# Patient Record
Sex: FEMALE | Race: BLACK OR AFRICAN AMERICAN | NOT HISPANIC OR LATINO | URBAN - NONMETROPOLITAN AREA
[De-identification: names, ages, dates, MRNs, and addresses within clinical notes are randomized per-mention and may not be internally consistent; named-entity substitution may affect disease eponyms.]

---

## 2019-09-11 ENCOUNTER — IMPORTED ENCOUNTER (OUTPATIENT)
Dept: URBAN - NONMETROPOLITAN AREA CLINIC 1 | Facility: CLINIC | Age: 54
End: 2019-09-11

## 2019-09-11 PROBLEM — H52.13: Noted: 2019-09-11

## 2019-09-11 PROBLEM — E11.9: Noted: 2019-09-11

## 2019-09-11 PROCEDURE — 99204 OFFICE O/P NEW MOD 45 MIN: CPT

## 2019-09-11 NOTE — PATIENT DISCUSSION
DM s DR-Stressed the importance of keeping blood sugars under control blood pressure under control and weight normalization and regular visits with PCP. -Explained the possible effects of poorly controlled diabetes and the damage that diabetes can cause to ocular health. -Patient to check HgbA1C.-Pt instructed to contact our office with any vision changes. Myopia-Discussed diagnosis with patient. -Explained that people who are myopic are at a higher risk for developing RD/RT and reviewed associated S&S.-Pt to contact our office if symptoms develop. Updated spec Rx given. Recommend lens that will provide comfort as well as protect safety and health of eyes. Astigmatism-Discussed diagnosis with patient.

## 2022-03-18 PROBLEM — R00.2 PALPITATION: Status: ACTIVE | Noted: 2018-10-03

## 2022-03-19 PROBLEM — G47.00 INSOMNIA: Status: ACTIVE | Noted: 2018-04-04

## 2022-03-19 PROBLEM — E66.9 OBESITY: Status: ACTIVE | Noted: 2018-04-04

## 2022-03-19 PROBLEM — F15.90 CAFFEINE USE DISORDER: Status: ACTIVE | Noted: 2018-10-03

## 2022-03-19 PROBLEM — Z86.19 HISTORY OF EPSTEIN-BARR VIRUS INFECTION: Status: ACTIVE | Noted: 2018-04-04

## 2022-03-19 PROBLEM — Z79.899 ENCOUNTER FOR LONG-TERM CURRENT USE OF MEDICATION: Status: ACTIVE | Noted: 2019-10-16

## 2022-03-19 PROBLEM — L98.9 SKIN LESION: Status: ACTIVE | Noted: 2018-10-03

## 2022-03-20 PROBLEM — I34.1 MITRAL VALVE PROLAPSE: Status: ACTIVE | Noted: 2018-10-03

## 2022-04-10 ASSESSMENT — VISUAL ACUITY
OS_SC: J2
OD_SC: 20/25
OD_SC: J2
OS_SC: 20/30-1

## 2022-04-10 ASSESSMENT — TONOMETRY
OD_IOP_MMHG: 15
OS_IOP_MMHG: 15

## 2022-06-20 ENCOUNTER — NURSE ONLY (OUTPATIENT)
Dept: INTERNAL MEDICINE CLINIC | Facility: CLINIC | Age: 57
End: 2022-06-20

## 2022-06-20 DIAGNOSIS — Z00.00 ROUTINE GENERAL MEDICAL EXAMINATION AT A HEALTH CARE FACILITY: ICD-10-CM

## 2022-06-20 LAB
ALBUMIN SERPL-MCNC: 3.7 G/DL (ref 3.5–5)
ALBUMIN/GLOB SERPL: 1.1 {RATIO} (ref 1.2–3.5)
ALP SERPL-CCNC: 61 U/L (ref 50–136)
ALT SERPL-CCNC: 18 U/L (ref 12–65)
ANION GAP SERPL CALC-SCNC: 7 MMOL/L (ref 7–16)
AST SERPL-CCNC: 13 U/L (ref 15–37)
BASOPHILS # BLD: 0 K/UL (ref 0–0.2)
BASOPHILS NFR BLD: 1 % (ref 0–2)
BILIRUB SERPL-MCNC: 0.4 MG/DL (ref 0.2–1.1)
BUN SERPL-MCNC: 16 MG/DL (ref 6–23)
CALCIUM SERPL-MCNC: 9.5 MG/DL (ref 8.3–10.4)
CHLORIDE SERPL-SCNC: 106 MMOL/L (ref 98–107)
CHOLEST SERPL-MCNC: 156 MG/DL
CO2 SERPL-SCNC: 27 MMOL/L (ref 21–32)
CREAT SERPL-MCNC: 1 MG/DL (ref 0.6–1)
DIFFERENTIAL METHOD BLD: ABNORMAL
EOSINOPHIL # BLD: 0.2 K/UL (ref 0–0.8)
EOSINOPHIL NFR BLD: 2 % (ref 0.5–7.8)
ERYTHROCYTE [DISTWIDTH] IN BLOOD BY AUTOMATED COUNT: 12.7 % (ref 11.9–14.6)
GLOBULIN SER CALC-MCNC: 3.4 G/DL (ref 2.3–3.5)
GLUCOSE SERPL-MCNC: 86 MG/DL (ref 65–100)
HCT VFR BLD AUTO: 44 % (ref 35.8–46.3)
HDLC SERPL-MCNC: 53 MG/DL (ref 40–60)
HDLC SERPL: 2.9 {RATIO}
HGB BLD-MCNC: 14.3 G/DL (ref 11.7–15.4)
IMM GRANULOCYTES # BLD AUTO: 0 K/UL (ref 0–0.5)
IMM GRANULOCYTES NFR BLD AUTO: 0 % (ref 0–5)
LDLC SERPL CALC-MCNC: 88.6 MG/DL
LYMPHOCYTES # BLD: 2.2 K/UL (ref 0.5–4.6)
LYMPHOCYTES NFR BLD: 30 % (ref 13–44)
MCH RBC QN AUTO: 28.7 PG (ref 26.1–32.9)
MCHC RBC AUTO-ENTMCNC: 32.5 G/DL (ref 31.4–35)
MCV RBC AUTO: 88.4 FL (ref 79.6–97.8)
MONOCYTES # BLD: 0.6 K/UL (ref 0.1–1.3)
MONOCYTES NFR BLD: 9 % (ref 4–12)
NEUTS SEG # BLD: 4.3 K/UL (ref 1.7–8.2)
NEUTS SEG NFR BLD: 58 % (ref 43–78)
NRBC # BLD: 0 K/UL (ref 0–0.2)
PLATELET # BLD AUTO: 296 K/UL (ref 150–450)
PMV BLD AUTO: 9 FL (ref 9.4–12.3)
POTASSIUM SERPL-SCNC: 4.3 MMOL/L (ref 3.5–5.1)
PROT SERPL-MCNC: 7.1 G/DL (ref 6.3–8.2)
RBC # BLD AUTO: 4.98 M/UL (ref 4.05–5.2)
SODIUM SERPL-SCNC: 140 MMOL/L (ref 136–145)
TRIGL SERPL-MCNC: 72 MG/DL (ref 35–150)
TSH W FREE THYROID IF ABNORMAL: 1.75 UIU/ML (ref 0.36–3.74)
VLDLC SERPL CALC-MCNC: 14.4 MG/DL (ref 6–23)
WBC # BLD AUTO: 7.4 K/UL (ref 4.3–11.1)

## 2022-06-21 NOTE — RESULT ENCOUNTER NOTE
Your thyroid is adequately replaced cholesterol has improved. Continue your current doses of medications. Hope you are feeling well. Thanks.   Kait Villagomez

## 2022-07-12 RX ORDER — TRAZODONE HYDROCHLORIDE 100 MG/1
TABLET ORAL
Qty: 90 TABLET | Refills: 3 | Status: SHIPPED | OUTPATIENT
Start: 2022-07-12

## 2022-07-13 ENCOUNTER — TELEMEDICINE (OUTPATIENT)
Dept: INTERNAL MEDICINE CLINIC | Facility: CLINIC | Age: 57
End: 2022-07-13
Payer: COMMERCIAL

## 2022-07-13 DIAGNOSIS — E66.09 CLASS 2 OBESITY DUE TO EXCESS CALORIES WITHOUT SERIOUS COMORBIDITY WITH BODY MASS INDEX (BMI) OF 35.0 TO 35.9 IN ADULT: ICD-10-CM

## 2022-07-13 DIAGNOSIS — F51.01 PRIMARY INSOMNIA: Primary | ICD-10-CM

## 2022-07-13 PROCEDURE — 99213 OFFICE O/P EST LOW 20 MIN: CPT | Performed by: INTERNAL MEDICINE

## 2022-07-13 RX ORDER — BUPROPION HYDROCHLORIDE 150 MG/1
150 TABLET ORAL EVERY MORNING
COMMUNITY
End: 2022-07-13 | Stop reason: SINTOL

## 2022-07-13 SDOH — ECONOMIC STABILITY: FOOD INSECURITY: WITHIN THE PAST 12 MONTHS, YOU WORRIED THAT YOUR FOOD WOULD RUN OUT BEFORE YOU GOT MONEY TO BUY MORE.: NEVER TRUE

## 2022-07-13 SDOH — ECONOMIC STABILITY: FOOD INSECURITY: WITHIN THE PAST 12 MONTHS, THE FOOD YOU BOUGHT JUST DIDN'T LAST AND YOU DIDN'T HAVE MONEY TO GET MORE.: NEVER TRUE

## 2022-07-13 ASSESSMENT — SOCIAL DETERMINANTS OF HEALTH (SDOH): HOW HARD IS IT FOR YOU TO PAY FOR THE VERY BASICS LIKE FOOD, HOUSING, MEDICAL CARE, AND HEATING?: NOT HARD AT ALL

## 2022-07-13 NOTE — PROGRESS NOTES
7/13/2022 3:04 PM  Location:[facility] [address] [facility] MED[facility]INE  [place]  Patient #:  [id number]  YOB: 1965            History of Present Illness     Chief Complaint   Patient presents with    Follow-up     Discuss Wellbutrin    Hypothyroidism       Ms. [facility] is a 55 y.o. female  who presents for follow up on chronic medical problems. Blaine Dominguez is a 55 y.o. female was evaluated through a synchronous (real-time) audio-video encounter. The patient (or guardian if applicable) is aware that this is a billable service, which includes applicable co-pays. This Virtual Visit was conducted with patient's (and/or legal guardian's) consent. The visit was conducted pursuant to the emergency declaration under the [address] [place] [address] and the [employer] ([employer]) and [employer] General Act. Patient identification was verified, and a caregiver was present when appropriate. The patient was located at Home: [place] [address]. Provider was located at [place] [address] (Appt Dept): [place] [address]. Notes that wellbutrin makes her feel lazy. Other  This is a chronic problem. Associated symptoms include fatigue (on meds).           Allergies   Allergen Reactions    Macadamia Nut Oil Other (See Comments)     Walnuts     Penicillins Other (See Comments)     \"been so long, I don't know\"    Avocado Nausea And Vomiting     \"virus-like symptoms\"     Past Medical History:   Diagnosis Date    Arthritis     osteo    Shelton-Barr viral infection 2006    insomnia and joint pain since    Mitral valve prolapse     hx of- pt reports no problems    Obesity (BMI 30-39.9) 4/4/2018     Social History     Socioeconomic History    Marital status:      Spouse name: None    Number of children: None    Years of education: None    Highest education level: None Occupational History    None   Tobacco Use    Smoking status: Former Smoker     Packs/day: 0.50    Smokeless tobacco: Never Used   Substance and Sexual Activity    Alcohol use: Yes    Drug use: No    Sexual activity: None   Other Topics Concern    None   Social History Narrative    None     Social Determinants of Health     Financial Resource Strain: Low Risk     Difficulty of Paying Living Expenses: Not hard at all   Food Insecurity: No Food Insecurity    Worried About Running Out of Food in the Last Year: Never true    Nilo of Food in the Last Year: Never true   Transportation Needs:     Lack of Transportation (Medical): Not on file    Lack of Transportation (Non-Medical):  Not on file   Physical Activity:     Days of Exercise per Week: Not on file    Minutes of Exercise per Session: Not on file   Stress:     Feeling of Stress : Not on file   Social Connections:     Frequency of Communication with Friends and Family: Not on file    Frequency of Social Gatherings with Friends and Family: Not on file    Attends [employer] Services: Not on file    Active Member of [address] [place] [address] or Organizations: Not on file    Attends Club or Organization Meetings: Not on file    Marital Status: Not on file   Intimate Partner Violence:     Fear of Current or Ex-Partner: Not on file    Emotionally Abused: Not on file    Physically Abused: Not on file    Sexually Abused: Not on file   Housing Stability:     Unable to Pay for Housing in the Last Year: Not on file    Number of [place] in the Last Year: Not on file    Unstable Housing in the Last Year: Not on file     Past Surgical History:   Procedure Laterality Date    HYSTERECTOMY (CERVIX STATUS UNKNOWN)  2012    LUMBAR LAMINECTOMY  1988/1994    L5/S1    TOTAL HIP ARTHROPLASTY Right 2014   [address] [facility]     Current Outpatient Medications   Medication Sig Dispense Refill    buPROPion (WELLBUTRIN XL) 150 MG extended release tablet Take 150 mg by mouth every morning      traZODone (DESYREL) 100 MG tablet TAKE ONE TABLET BY MOUTH AT NIGHT AS NEEDED FOR SLEEP 90 tablet 3    Apple Cider Vinegar 300 MG TABS Take by mouth      ascorbic acid (VITAMIN C) 250 MG tablet Take by mouth      Biotin 2.5 MG CAPS Take by mouth      Cholecalciferol 50 MCG (2000 UT) TABS Take by mouth daily      cyanocobalamin 1000 MCG tablet Take 1,000 mcg by mouth daily      levothyroxine (SYNTHROID) 50 MCG tablet Take 50 mcg by mouth every morning (before breakfast)      traZODone (DESYREL) 50 MG tablet Take  mg by mouth      vitamin E 400 UNIT capsule Take 400 Units by mouth daily       No current facility-administered medications for this visit. Health Maintenance   Topic Date Due    HIV screen  Never done    Hepatitis C screen  Never done    DTaP/Tdap/Td vaccine (1 - Tdap) Never done    Cervical cancer screen  Never done    Colorectal Cancer Screen  Never done    Shingles vaccine (1 of 2) Never done    COVID-19 Vaccine (3 - Booster for Moderna series) 07/28/2021    Breast cancer screen  12/09/2021    Flu vaccine (1) 09/01/2022    Depression Screen  11/04/2022    Lipids  06/20/2027    Hepatitis A vaccine  Aged Out    Hepatitis B vaccine  Aged Out    Hib vaccine  Aged Out    Meningococcal (ACWY) vaccine  Aged Out    Pneumococcal 0-64 years Vaccine  Aged Out     Family History   Problem Relation Age of Onset    Prostate Cancer Father     Osteoarthritis Father     Thyroid Disease Mother     Colon Polyps Father     Asthma Sister              Review of Systems  Review of Systems   Constitutional: Positive for fatigue (on meds). Negative for unexpected weight change. There were no vitals taken for this visit. Physical Exam    Physical Exam  Constitutional:       General: She is not in acute distress. Appearance: Normal appearance. HENT:      Head: Normocephalic.    Neurological:      Mental Status: She is alert and oriented to person, place, and time. Psychiatric:         Mood and Affect: Mood normal.         Behavior: Behavior normal.           Assessment & Plan    Current Outpatient Medications   Medication Sig Dispense Refill    buPROPion (WELLBUTRIN XL) 150 MG extended release tablet Take 150 mg by mouth every morning      traZODone (DESYREL) 100 MG tablet TAKE ONE TABLET BY MOUTH AT NIGHT AS NEEDED FOR SLEEP 90 tablet 3    Apple Cider Vinegar 300 MG TABS Take by mouth      ascorbic acid (VITAMIN C) 250 MG tablet Take by mouth      Biotin 2.5 MG CAPS Take by mouth      Cholecalciferol 50 MCG (2000 UT) TABS Take by mouth daily      cyanocobalamin 1000 MCG tablet Take 1,000 mcg by mouth daily      levothyroxine (SYNTHROID) 50 MCG tablet Take 50 mcg by mouth every morning (before breakfast)      traZODone (DESYREL) 50 MG tablet Take  mg by mouth      vitamin E 400 UNIT capsule Take 400 Units by mouth daily       No current facility-administered medications for this visit. No orders of the defined types were placed in this encounter. No orders of the defined types were placed in this encounter. Medications Discontinued During This Encounter   Medication Reason    naltrexone-buPROPion (CONTRAVE) 8-90 MG per extended release tablet LIST CLEANUP        Diagnosis Orders   1. Primary insomnia     2. Class 2 obesity due to excess calories without serious comorbidity with body mass index (BMI) of 35.0 to 35.9 in adult        Will stop the wellbutrin. Refilled her trazodone when requested by the pharmacy. Knows to keep a low threshold for contacting the office with worsening symptoms. Recommended 30 minutes of aerobic exercise at least 4-5 days weekly for physical as well as emotional reasons. Follow up as documented or earlier as needed. Return in about 4 months (around 11/13/2022).           Lulu Falcon MD

## 2022-07-15 ENCOUNTER — PATIENT MESSAGE (OUTPATIENT)
Dept: INTERNAL MEDICINE CLINIC | Facility: CLINIC | Age: 57
End: 2022-07-15

## 2022-07-18 RX ORDER — LEVOTHYROXINE SODIUM 0.07 MG/1
75 TABLET ORAL EVERY MORNING
Qty: 90 TABLET | Refills: 3 | Status: SHIPPED | OUTPATIENT
Start: 2022-07-18

## 2022-07-18 RX ORDER — LEVOTHYROXINE SODIUM 0.07 MG/1
75 TABLET ORAL EVERY MORNING
Refills: 3 | COMMUNITY
Start: 2022-07-07 | End: 2022-07-18 | Stop reason: SDUPTHER

## 2022-07-18 NOTE — TELEPHONE ENCOUNTER
From: Caesar Arellano  To: Dr. Cooney End: 7/15/2022 3:36 PM EDT  Subject: Levothyroxine     Hi Dr Sharon Fung. I forgot to ask you during our visit on Wednesday, since we are leaving my doseage at 75mcg, instead of 30 day supply, could you change my RX to 90 day? There is no rush as I just had it refilled last week. Please and thank you. Have a wonderful weekend.   [place]

## 2022-10-31 RX ORDER — TRAZODONE HYDROCHLORIDE 50 MG/1
TABLET ORAL
Qty: 90 TABLET | Refills: 5 | Status: SHIPPED | OUTPATIENT
Start: 2022-10-31

## 2022-11-15 ENCOUNTER — OFFICE VISIT (OUTPATIENT)
Dept: INTERNAL MEDICINE CLINIC | Facility: CLINIC | Age: 57
End: 2022-11-15
Payer: COMMERCIAL

## 2022-11-15 VITALS
HEART RATE: 66 BPM | WEIGHT: 242.6 LBS | DIASTOLIC BLOOD PRESSURE: 66 MMHG | TEMPERATURE: 98.2 F | HEIGHT: 67 IN | SYSTOLIC BLOOD PRESSURE: 124 MMHG | BODY MASS INDEX: 38.08 KG/M2 | OXYGEN SATURATION: 97 %

## 2022-11-15 DIAGNOSIS — E66.01 CLASS 2 SEVERE OBESITY DUE TO EXCESS CALORIES WITH SERIOUS COMORBIDITY AND BODY MASS INDEX (BMI) OF 38.0 TO 38.9 IN ADULT (HCC): Primary | ICD-10-CM

## 2022-11-15 DIAGNOSIS — M79.672 LEFT FOOT PAIN: ICD-10-CM

## 2022-11-15 PROCEDURE — 99213 OFFICE O/P EST LOW 20 MIN: CPT | Performed by: INTERNAL MEDICINE

## 2022-11-15 RX ORDER — PHENTERMINE HYDROCHLORIDE 37.5 MG/1
37.5 CAPSULE ORAL EVERY MORNING
Qty: 30 CAPSULE | Refills: 0 | Status: SHIPPED | OUTPATIENT
Start: 2022-11-15 | End: 2022-12-15

## 2022-11-15 RX ORDER — CYCLOBENZAPRINE HCL 10 MG
TABLET ORAL
COMMUNITY
Start: 2022-11-04

## 2022-11-15 RX ORDER — PHENTERMINE HYDROCHLORIDE 37.5 MG/1
37.5 CAPSULE ORAL EVERY MORNING
Qty: 30 CAPSULE | Refills: 0 | Status: SHIPPED | OUTPATIENT
Start: 2022-12-15 | End: 2023-01-14

## 2022-11-15 RX ORDER — IBUPROFEN 200 MG
400 TABLET ORAL
Qty: 120 TABLET | Refills: 3 | COMMUNITY
Start: 2022-11-15

## 2022-11-15 ASSESSMENT — PATIENT HEALTH QUESTIONNAIRE - PHQ9
2. FEELING DOWN, DEPRESSED OR HOPELESS: 0
SUM OF ALL RESPONSES TO PHQ QUESTIONS 1-9: 0
SUM OF ALL RESPONSES TO PHQ9 QUESTIONS 1 & 2: 0
SUM OF ALL RESPONSES TO PHQ QUESTIONS 1-9: 0
1. LITTLE INTEREST OR PLEASURE IN DOING THINGS: 0

## 2022-11-15 ASSESSMENT — ENCOUNTER SYMPTOMS
BLOOD IN STOOL: 0
COUGH: 0
SHORTNESS OF BREATH: 0
NAUSEA: 0
WHEEZING: 0
CONSTIPATION: 0
DIARRHEA: 1
VOMITING: 0

## 2022-11-15 NOTE — PROGRESS NOTES
11/15/2022 12:35 PM  Location:[facility] [address] [facility]  [place]  Patient #:  [id number]  YOB: 1965            History of Present Illness     Chief Complaint   Patient presents with    Follow-up     Pt presents to the office today for their 4 month follow-up. Fall     2 weeks ago pt slipped on a piece of paper and injured her foot, knee and hip. Went to urgent care and they said no fracture, but it is not getting better. Ms. Jillian Leal is a 55 y.o. female  who presents for the above. Is having body image issues that she anticipates are going to interfere in her marital relationship.            Allergies   Allergen Reactions    Macadamia Nut Oil Other (See Comments)     Walnuts     Penicillins Other (See Comments)     \"been so long, I don't know\"    Avocado Nausea And Vomiting     \"virus-like symptoms\"     Past Medical History:   Diagnosis Date    Arthritis     osteo    Shelton-Barr viral infection     insomnia and joint pain since    Mitral valve prolapse     hx of- pt reports no problems    Obesity (BMI 30-39.9) 2018     Social History     Socioeconomic History    Marital status:      Spouse name: None    Number of children: None    Years of education: None    Highest education level: None   Tobacco Use    Smoking status: Former     Packs/day: 0.50     Years: 20.00     Pack years: 10.00     Types: Cigarettes     Quit date: 2020     Years since quittin.8    Smokeless tobacco: Never   Substance and Sexual Activity    Alcohol use: Yes    Drug use: No     Social Determinants of Health     Financial Resource Strain: Low Risk     Difficulty of Paying Living Expenses: Not hard at all   Food Insecurity: No Food Insecurity    Worried About Running Out of Food in the Last Year: Never true    Ran Out of Food in the Last Year: Never true     Past Surgical History:   Procedure Laterality Date    HYSTERECTOMY (CERVIX STATUS UNKNOWN)  2012    LUMBAR LAMINECTOMY  1988/1994    L5/S1    TOTAL HIP ARTHROPLASTY Right 2014    TUBAL LIGATION  1990     Current Outpatient Medications   Medication Sig Dispense Refill    phentermine 37.5 MG capsule Take 1 capsule by mouth every morning for 30 days. 30 capsule 0    [START ON 12/15/2022] phentermine 37.5 MG capsule Take 1 capsule by mouth every morning for 30 days. 30 capsule 0    ibuprofen (ADVIL) 200 MG tablet Take 2 tablets by mouth 2 times daily (before meals) 120 tablet 3    traZODone (DESYREL) 50 MG tablet TAKE ONE TO THREE TABLETS BY MOUTH NIGHTLY 90 tablet 5    levothyroxine (SYNTHROID) 75 MCG tablet Take 1 tablet by mouth every morning 90 tablet 3    traZODone (DESYREL) 100 MG tablet TAKE ONE TABLET BY MOUTH AT NIGHT AS NEEDED FOR SLEEP 90 tablet 3    Apple Cider Vinegar 300 MG TABS Take by mouth      ascorbic acid (VITAMIN C) 250 MG tablet Take by mouth      Biotin 2.5 MG CAPS Take by mouth      Cholecalciferol 50 MCG (2000 UT) TABS Take by mouth daily      cyanocobalamin 1000 MCG tablet Take 1,000 mcg by mouth daily      cyclobenzaprine (FLEXERIL) 10 MG tablet        No current facility-administered medications for this visit.      Health Maintenance   Topic Date Due    HIV screen  Never done    Hepatitis C screen  Never done    DTaP/Tdap/Td vaccine (1 - Tdap) Never done    Colorectal Cancer Screen  Never done    Shingles vaccine (1 of 2) Never done    COVID-19 Vaccine (3 - Booster for Moderna series) 04/25/2021    Breast cancer screen  12/09/2021    Flu vaccine (1) Never done    Depression Screen  11/04/2022    Lipids  06/20/2027    Hepatitis A vaccine  Aged Out    Hib vaccine  Aged Out    Meningococcal (ACWY) vaccine  Aged Out    Pneumococcal 0-64 years Vaccine  Aged Out     Family History   Problem Relation Age of Onset    Prostate Cancer Father     Osteoarthritis Father     Thyroid Disease Mother     Colon Polyps Father     Asthma Sister              Review of Systems  Review of Systems   Constitutional: Positive for unexpected weight change. Respiratory:  Negative for cough, shortness of breath and wheezing. Cardiovascular:  Negative for chest pain, palpitations and leg swelling. Gastrointestinal:  Positive for diarrhea (loose stools). Negative for blood in stool, constipation, nausea and vomiting. Musculoskeletal:  Positive for arthralgias. Psychiatric/Behavioral:  Negative for dysphoric mood. The patient is not nervous/anxious. /66 (Site: Left Upper Arm, Position: Sitting, Cuff Size: Medium Adult)   Pulse 66   Temp 98.2 °F (36.8 °C) (Temporal)   Ht 5' 7\" (1.702 m)   Wt 242 lb 9.6 oz (110 kg)   SpO2 97%   BMI 38.00 kg/m²       Physical Exam    Physical Exam  Constitutional:       General: She is not in acute distress. Appearance: Normal appearance. She is obese. HENT:      Head: Normocephalic. Musculoskeletal:      Left ankle: No swelling. No tenderness. Normal range of motion. Left foot: Normal range of motion (pain with rotation of the forefoot). Swelling and tenderness present. Feet:    Neurological:      Mental Status: She is alert and oriented to person, place, and time. Psychiatric:         Mood and Affect: Mood normal.         Behavior: Behavior normal.         Assessment & Plan    Current Outpatient Medications   Medication Sig Dispense Refill    phentermine 37.5 MG capsule Take 1 capsule by mouth every morning for 30 days. 30 capsule 0    [START ON 12/15/2022] phentermine 37.5 MG capsule Take 1 capsule by mouth every morning for 30 days.  30 capsule 0    ibuprofen (ADVIL) 200 MG tablet Take 2 tablets by mouth 2 times daily (before meals) 120 tablet 3    traZODone (DESYREL) 50 MG tablet TAKE ONE TO THREE TABLETS BY MOUTH NIGHTLY 90 tablet 5    levothyroxine (SYNTHROID) 75 MCG tablet Take 1 tablet by mouth every morning 90 tablet 3    traZODone (DESYREL) 100 MG tablet TAKE ONE TABLET BY MOUTH AT NIGHT AS NEEDED FOR SLEEP 90 tablet 3    Apple Cider Vinegar 300 MG TABS Take by mouth      ascorbic acid (VITAMIN C) 250 MG tablet Take by mouth      Biotin 2.5 MG CAPS Take by mouth      Cholecalciferol 50 MCG (2000 UT) TABS Take by mouth daily      cyanocobalamin 1000 MCG tablet Take 1,000 mcg by mouth daily      cyclobenzaprine (FLEXERIL) 10 MG tablet        No current facility-administered medications for this visit. No orders of the defined types were placed in this encounter. Orders Placed This Encounter   Medications    phentermine 37.5 MG capsule     Sig: Take 1 capsule by mouth every morning for 30 days. Dispense:  30 capsule     Refill:  0    phentermine 37.5 MG capsule     Sig: Take 1 capsule by mouth every morning for 30 days. Dispense:  30 capsule     Refill:  0    ibuprofen (ADVIL) 200 MG tablet     Sig: Take 2 tablets by mouth 2 times daily (before meals)     Dispense:  120 tablet     Refill:  3       Medications Discontinued During This Encounter   Medication Reason    vitamin E 400 UNIT capsule LIST CLEANUP        Diagnosis Orders   1. Class 2 severe obesity due to excess calories with serious comorbidity and body mass index (BMI) of 38.0 to 38.9 in adult (HCC)  phentermine 37.5 MG capsule    phentermine 37.5 MG capsule      2. Left foot pain           Advised her to use the above for just a short period of time. Apply a compressive ACE bandage. Rest and elevate the affected painful area. Apply cold compresses intermittently as needed. As pain recedes, begin normal activities slowly as tolerated. Call if symptoms persist for imaging. Take ibuprofen as above. Knows to keep a low threshold for contacting the office with worsening symptoms. Document BP several times on two days weekly. Contact office if not <=130/80 consistently while on the above. Return in about 4 months (around 3/15/2023).           Mely Valentine MD

## 2022-11-22 ENCOUNTER — PATIENT MESSAGE (OUTPATIENT)
Dept: INTERNAL MEDICINE CLINIC | Facility: CLINIC | Age: 57
End: 2022-11-22

## 2022-11-22 DIAGNOSIS — M79.672 LEFT FOOT PAIN: Primary | ICD-10-CM

## 2022-11-22 DIAGNOSIS — S91.302D: ICD-10-CM

## 2022-11-22 NOTE — TELEPHONE ENCOUNTER
From: Rickie Skiff  To: Dr. Martines Crews: 11/22/2022 9:21 AM EST  Subject: Update on my L foot/ankle/leg    I can do most of the exercises, but bending my foot up toward my shin, and rotating inward are both painful. Walking is still an issue. my second and third toes still feel tingly, not quite numb (I don't think) weird feeling. I stay off it as much as possible, and still can't walk long distances. I can't go up the stairs in our home, but can climb 2-3, with a handrail. I am still icing, haven't noticed a significant difference with the ibuprofen. I ordered an ankle brace that gives me a little more compression, it feels a little more supported with this brace. You had asked me to update you this week, I think thats everything :) . Thank you for taking such good care of me.  Happy Thanksgiving!!

## 2022-11-22 NOTE — TELEPHONE ENCOUNTER
Please fax CT scan order to [facility]. Needs this rather than xray. Thanks.   Kait Villagomez

## 2022-12-05 ENCOUNTER — OFFICE VISIT (OUTPATIENT)
Dept: ORTHOPEDIC SURGERY | Age: 57
End: 2022-12-05

## 2022-12-05 VITALS — BODY MASS INDEX: 37.04 KG/M2 | WEIGHT: 236 LBS | HEIGHT: 67 IN

## 2022-12-05 DIAGNOSIS — M76.812 ANTERIOR TIBIALIS TENDINITIS OF LEFT LOWER EXTREMITY: ICD-10-CM

## 2022-12-05 DIAGNOSIS — S99.912A INJURY OF LEFT ANKLE, INITIAL ENCOUNTER: ICD-10-CM

## 2022-12-05 DIAGNOSIS — M25.872 IMPINGEMENT SYNDROME OF LEFT ANKLE: Primary | ICD-10-CM

## 2022-12-05 DIAGNOSIS — M25.572 ACUTE LEFT ANKLE PAIN: ICD-10-CM

## 2022-12-05 RX ORDER — PREDNISONE 5 MG/1
TABLET ORAL
Qty: 1 EACH | Refills: 2 | Status: SHIPPED | OUTPATIENT
Start: 2022-12-05

## 2022-12-05 NOTE — PROGRESS NOTES
The patient was prescribed a walker boot for the patient's left foot. The patient wears a size NA shoe and I fitted them with a M size boot. The patient was fitted and instructed on the use of prescribed walker boot. I explained how to fit themselves and that the plastic flexible piece should always be on the front of the boot and secured by the Velcro straps on top. The air bladder in the boot was adjusted according to proper fit and comfort. The patient walked a short distance and acknowledged satisfaction with current fit. I also explained that they need a heel lift or a higher heeled shoe for the uninvolved LE to help normalize gait and avoid excessive low back stress/strain due to leg length inequality created from walker boot. The patient was also prescribed and fitted with an even up for the right foot while wearing the walker boot on the left. Patient read and signed documenting they understand and agree to [facility]'s current DME return policy.

## 2022-12-05 NOTE — PROGRESS NOTES
Name: Dawson Silver  YOB: 1965  Gender: female  MRN: [id number]     CC: Left ankle pain    HPI:   11/03/2022: She reports falling in [place] injuring her ankle/foot. 11/30/2022: Left foot CT scan  12/05/2022: She presents with improved but persistent anterior medial ankle pain with dorsiflexion and prolonged activity    ROS/Meds/PSH/PMH/FH/SH: reviewed today    Tobacco:  reports that she quit smoking about 2 years ago. Her smoking use included cigarettes. She has a 10.00 pack-year smoking history. She has never used smokeless tobacco.     Physical Examination:  Patient appears to be alert and oriented with acceptable appearance. No obvious distress or SOB  CV: appears to have acceptable vascular color and capillary refill  Neuro: appears to have mostly intact light touch sensation   Skin: No appreciable ankle effusion  MS: Standing: Plantigrade: Gait near full  Left = anterior medial ankle ATT >> ankle joint pain   Left = no lateral pain; no PTT pain; no hindfoot or midfoot pain  Left = good ankle/foot motion; 5/5 strength    XR: Left side: Standing AP lateral mortise ankle plus AP oblique foot taken today with medial malleolar exostoses; anterior medial ankle impingement exostoses; tarsometatarsal appear aligned with no diastases; narrowing calcaneonavicular but no true coalition; no anterior calcaneal process noted  XR Impression:  As above      Reviewed Test/Records/Documents:   11/30/2022: Left foot CT scan: Radiologic impression: No evidence of acute osseous abnormality. Probable remote avulsion injury along the medial malleolus with tiny osteophyte tibiotalar joint. Under findings section radiologist mentions tiny osteophytes along the anterior margin of the tibial plafond and articulation of the medial malleolus and talus.      Injection: Discussed as an option    Assessment:    Left ankle injury: Suspected injury chronic anterior medial ankle impingement exostoses  Left ankle injury: Suspected anterior tibial tendinitis    Plan:   The patient and I discussed the above assessment. We explored treatment options. She hurts more along the ATT but no evidence of ATT deficiency  She has anterior medial ankle impingement exostoses on x-ray and that also could be a source of her pain. I see no fracture line  Advanced medical imaging: Left ankle MRI scan: Potential    DME: Left 3D boot: Right even up   We discussed left ankle care and 3D boot with even up protection for protection of left ankle and right NELA  PT: Prescribed at: [facility]  Orthotic/prosthetic: No indication today       Medication - OTC meds prn: Prescribed: Prednisone 5 mg Sterapred pack; #48; take per package insert  Surgical discussion: No indication today for surgery, but surgical potential for anterior ankle resection exostectomy  Follow up: 3-4 weeks  Work status: Sitting limited standing walking in 3D boot    History and discussion of management with: Her     This note was created using Dragon voice recognition software which may result in errors of speech and spelling recognition and word/phrase syntax errors.

## 2022-12-05 NOTE — LETTER
DME Patient Authorization Form    Name: Melody Guzman  : 1965  MRN: [id number]   Age: 55 y.o. Gender: female  Delivery Address: [facility]     Diagnosis:     ICD-10-CM    1. Left foot pain  M79.672 XR ANKLE LEFT (MIN 3 VIEWS)     XR FOOT LEFT (2 VIEWS)     Amb External Referral To Physical Therapy     [facility] ()     Geryl Nadeem ()      2. Impingement syndrome of left ankle  M25.872 Amb External Referral To Physical Therapy     [facility] ()     Geryl Nadeem ()      3. Injury of left ankle, initial encounter  F25.118W Amb External Referral To Physical Therapy     [facility] ()     Geryl Nadeem ()      4. Anterior tibialis tendinitis of left lower extremity  M76.812 Amb External Referral To Physical Therapy     [facility] ()     [facility] ()           Requested DME:  [facility] - ** ($30) X 1 - right  Walker [facility] -  ($290.00) X 1 - left        Clinical Notes:     **Indicates non-covered items by insurance. Payment expected on date of service. Electronically signed by  Provider: Dimple Valadez MD__Date: 2022                            [address] Tax ID # [id number]        Durable Medical Equipment and/or Orthotics Patient Consent     I understand that my physician has prescribed this medical supply as part of my treatment plan as a matter of Medical Necessity.  I understand that I have a choice in where I receive my prescribed orthopedic supplies and/or services.  I authorize [facility] to furnish this service/product and to provide my insurance carrier with any information requested in order to process for payment.  I instruct my insurance carrier to pay [facility] directly for these services/products.    I understand that my insurance carrier may deny payment for this supply because it is a non-covered item, deemed not medically necessary or considered experimental.   I understand that any cost not covered by my insurance carrier will be solely my financial responsibility.  I have received the Supplier Standards and have reviewed them.  I have received the prescribed item and have been fully instructed on the proper use of the above services/products.    ______ (Patient Initials) I understand that all DME items are non-returnable after being dispensed. Items still in sealed packaging may be returned up to 14 days after purchasing. [address] will replace items that are defective.    ______ (Patient Initials) I understand that [facility] will not file a claim with my insurance carrier for this service/product and I am waiving my right to file a claim on my own for this service/product with my insurance company as this item is NON-COVERED (Denoted by the **) by my Insurance company/policy. ______ (Patient Initials) I understand that I am responsible to bring my equipment to the hospital for any surgery. ______________________________________________  ________________________  Patient / [place]            Thank you for considering [address] Your physician has prescribed specific medical equipment or devices for your home use. The following describes your rights and responsibilities as our customer. Right to Choose Providers: You have a choice regarding which company supplies your home medical equipment and devices, and to consult your physician in this decision. You may choose a medical supply store, a home medical equipment provider, or a specialist such as [facility]. [facility] will coordinate with your physician to provide the medical equipment or devices prescribed for your home use.     Right to Service:  You have the right to considerate, respectful and nondiscriminatory care. You have the right to receive accurate and easily understood information about your health care. If you speak a foreign language, or don't understand the discussions, assistance will be provided to allow you to make informed health care decisions. You have the right to know your treatment options and to participate in decisions about your care, including the right to accept or refuse treatment. You have the right to expect a reasonable response to your requests for treatment or service. You have the right to talk in confidence with health care providers and to have your health care information protected. You have the right to receive an explanation of your bill. You have the right to complain about the service or product you receive. Patient Responsibilities:  Please provide complete and accurate information about your health insurance benefits and make arrangements for the timely payment of your bill. [facility] will, if possible, assume responsibility for billing your insurance (Medicare, Medicaid and commercial) for the prescribed equipment or devices. If your policy does not cover the prescribed product, or only covers a portion of the bill, you are responsible for any remaining balance. Return and Exchange Policy:  [facility] will honor published  Warranties for products. [facility] will accept returns or exchanges within 14 days from the date of receipt, providin) the product must be in new condition; 2) receipt as required; and 3) used disposable and hygiene products may only be returned due to a defective product. Note: Refunds will be issued in a timely manner, please allow 4-6 weeks for processing. Complaint Procedures and DME Consumer Protection Resources:  [facility] values you as a customer, and is committed to resolving patient concerns.   This commitment includes understanding and documenting your concerns, conducting a review of internal procedures, and providing you with an explanation and resolution to your concerns. Should you have any questions about our services or billing process, please contact our office at (practice phone number). If we are unable to resolve the concern, you have the right to direct comments to the office of Consumer Protection, in the [address] or the [facility] office, without fear of repercussion. DMEPOS SUPPLIER STANDARDS    A supplier must be in compliance with all applicable Federal and [facility] and regulatory requirements. A supplier must provide complete and accurate information on the DMEPOS supplier application. Any changes to this information must be reported to the [facility] within 30 days. An authorized individual (one whose signature is binding) must sign the application for billing privileges. A supplier must fill orders from its own inventory, or must contract with other companies for the purchase of items necessary to fill the order. A supplier may not contract with any entity that is currently excluded from the Medicare program, any [facility] program, or from any other Federal procurement or Nonprocurement programs. A supplier must advise beneficiaries that they may rent or purchase inexpensive or routinely purchased durable medical equipment, and of the purchase option for capped rental equipment. A supplier must notify beneficiaries of warranty coverage and honor all warranties under applicable State Law, and repair or replace free of charge Medicare covered items that are under warranty. A supplier must maintain a physical facility on an appropriate site. A supplier must permit CMS, or its agents to conduct on-site inspections to ascertain the supplier's compliance with these standards.   The supplier location must be accessible to beneficiaries during reasonable business hours, and must maintain a visible sign and posted hours of operation. A supplier must maintain a primary business telephone listed under the name of the business in a [employer] or a toll free number available through directory assistance. The exclusive use of a beeper, answering machine or cell phone is prohibited. A supplier must have comprehensive liability insurance in the amount of at least $300,000 that covers both the supplier's place of business and all customers and employees of the supplier. If the supplier manufactures its own items, this insurance must also cover product liability and completed operations. A supplier must agree not to initiate telephone contact with beneficiaries, with a few exceptions allowed. This standard prohibits suppliers from calling beneficiaries in order to solicit new business. A supplier is responsible for delivery and must instruct beneficiaries on use of Medicare covered items, and maintain proof of delivery. A supplier must answer questions, and respond to complaints of the beneficiaries, and maintain documentation of such contacts. A supplier must maintain and replace at no charge or repair directly, or through a service contract with another company, Medicare covered items it has rented to beneficiaries. A supplier must accept returns of substandard (less than full quality for the particular item) or unsuitable items (inappropriate for the beneficiary at the time it was fitted and rented or sold) from beneficiaries. A supplier must disclose these supplier standards to each beneficiary to whom it supplies a Medicare-covered item. A supplier must disclose to the government any person having ownership, financial, or control interest in the supplier. A supplier must not convey or reassign a supplier number; i.e., the supplier may not sell or allow another entity to use its Medicare billing number.   A supplier must have a complaint resolution protocol established to address beneficiary complaints that relate to these standards. A record of these complaints must be maintained at the physical facility. Complaint records must include: the name, address, telephone number and health insurance claim number of the beneficiary, a summary of the complaint, and any action taken to resolve it. A supplier must agree to furnish CMS any information required by the Medicare statute and implementing regulations. A supplier of DMEPOS and other items and services must be accredited by a CMS-approved accreditation organization in order to receive and retain a supplier billing number. The accreditation must indicate the specific products and services, for which the supplier is accredited in order for the supplier to receive payment for those specific products and services. A DMEPOS supplier must notify their accreditation organization when a new DMEPOS location is opened. The accreditation organization may accredit the new supplier location for three months after it is operational without requiring a new site visit. All DMEPOS supplier locations, whether owned or subcontracted, must meet the [employer] and be separately accredited in order to bill Medicare. An accredited supplier may be denied enrollment or their enrollment may be revoked, if CMS determines that they are not in compliance with the DMEPOS quality standards. A DMEPOS supplier must disclose upon enrollment all products and services, including the addition of new product lines for which they are seeking accreditation. If a new product line is added after enrollment, the DMEPOS supplier will be responsible for notifying the accrediting body of the new product so that the DMEPOS supplier can be re-surveyed and accredited for these new products. Must meet the surety bond requirements specified in 42 C. F.R. 424.57(c). Implementation date- May 4, 2009. A supplier must obtain oxygen from a state-licensed oxygen supplier.   A supplier must maintain ordering and referring documentation consistent with provisions found in 42 C. F.R. 424.516(f). DMEPOS suppliers are prohibited from sharing a practice location with certain other Medicare providers and suppliers. DMEPOS suppliers must remain open to the public for a minimum of 30 hours per week with certain exceptions.

## 2022-12-27 ENCOUNTER — OFFICE VISIT (OUTPATIENT)
Dept: ORTHOPEDIC SURGERY | Age: 57
End: 2022-12-27

## 2022-12-27 VITALS — WEIGHT: 242 LBS | BODY MASS INDEX: 37.98 KG/M2 | HEIGHT: 67 IN

## 2022-12-27 DIAGNOSIS — M76.812 ANTERIOR TIBIALIS TENDINITIS OF LEFT LOWER EXTREMITY: ICD-10-CM

## 2022-12-27 DIAGNOSIS — S99.912S INJURY OF LEFT ANKLE, SEQUELA: Primary | ICD-10-CM

## 2022-12-27 DIAGNOSIS — M25.572 ACUTE LEFT ANKLE PAIN: ICD-10-CM

## 2022-12-27 NOTE — PROGRESS NOTES
Name: Giselle Rogers  YOB: 1965  Gender: female  MRN: [id number]     12/27/2022: She reports improvement after medication and 4 sessions of PT. She weaned 3D boot    HPI:   11/03/2022: She reports falling in [place] injuring her ankle/foot. 11/30/2022: Left foot CT scan  12/05/2022: Initial visit for: Left ankle pain: improved but persistent anterior medial ankle pain with dorsiflexion and prolonged activity    ROS/Meds/PSH/PMH/FH/SH: reviewed today    Tobacco:  reports that she quit smoking about 2 years ago. Her smoking use included cigarettes. She has a 10.00 pack-year smoking history. She has never used smokeless tobacco.     Physical Examination:  Patient appears to be alert and oriented with acceptable appearance. No obvious distress or SOB  CV: appears to have acceptable vascular color and capillary refill  Neuro: appears to have mostly intact light touch sensation   Skin: No swelling  MS: Standing: Plantigrade: Gait near full  Left = insertional PTT and insertional ATT pain >> ankle joint pain   Left = full ankle/foot motion; 5/5 strength; no instability; no crepitance    XR: Left side: Standing AP lateral mortise ankle plus AP oblique foot taken 12/05/2022 with medial malleolar exostoses; anterior medial ankle impingement exostoses; tarsometatarsal appear aligned with no diastases; narrowing calcaneonavicular but no true coalition; no anterior calcaneal process noted  XR Impression:  As above      Reviewed Test/Records/Documents:   11/30/2022: Left foot CT scan: Radiologic impression: No evidence of acute osseous abnormality. Probable remote avulsion injury along the medial malleolus with tiny osteophyte tibiotalar joint. Under findings section radiologist mentions tiny osteophytes along the anterior margin of the tibial plafond and articulation of the medial malleolus and talus.      Injection: Discussed as an option    Assessment:    Left ankle injury: chronic anterior medial ankle impingement exostoses  Left ankle injury: Insertional posterior tibial tendinitis; insertional anterior tibial tendinitis    Plan:   The patient and I discussed the above assessment. We explored treatment options. Her pain still surrounds the anterior medial ankle but I feel it is more tendinitis than ankle related  She is much better after Prednisone and 4 sessions of PT  She has no evidence of PTT or PTT deficiency  Due to a right NELA and low back surgeries, she was unable to tolerate the 3D boot  Plan today is ankle brace and continued PT  If no improvement or resolution, MRI scan     Advanced medical imaging: Left ankle MRI scan: Assess anterior medial ankle pain for stress-induced bone edema related to chronic anterior medial ankle impingement exostoses versus ATT insertional tear versus insertional PTT tear    DME: Reparel ankle sleeve: Lace up ankle brace  PT: Prescribed more PT at: [facility]  Orthotic/prosthetic: No indication today       Medication - OTC meds prn: Completed prior prescribed: Prednisone 5 mg Sterapred pack; #48  Surgical discussion: No indication today for surgery, but surgical potential for anterior ankle resection exostectomy  Follow up: As discussed  Work status: Sitting limited standing walking     This note was created using Dragon voice recognition software which may result in errors of speech and spelling recognition and word/phrase syntax errors.

## 2022-12-27 NOTE — LETTER
DME Patient Authorization Form    Name: Boubacar Green  : 1965  MRN: [id number]   Age: 65 y.o. Gender: female  Delivery Address: [facility]     Diagnosis:     ICD-10-CM    1. Injury of left ankle, sequela  S99.912S       2. Anterior tibialis tendinitis of left lower extremity  M76.812       3. Acute left ankle pain  M25.572            Requested DME:  Reparel Ankle Sleeve**AANKL ($30) X 1 - left  Wraptor Ankle Brace - -42 ($129.00) X 1 - left        Clinical Notes:     **Indicates non-covered items by insurance. Payment expected on date of service. Electronically signed by  Provider: Winston Mast MD__Date: 2022                            [facility]/[address] Tax ID # [id number]        Durable Medical Equipment and/or Orthotics Patient Consent     I understand that my physician has prescribed this medical supply as part of my treatment plan as a matter of Medical Necessity.  I understand that I have a choice in where I receive my prescribed orthopedic supplies and/or services.  I authorize [facility] to furnish this service/product and to provide my insurance carrier with any information requested in order to process for payment.  I instruct my insurance carrier to pay [facility] directly for these services/products.  I understand that my insurance carrier may deny payment for this supply because it is a non-covered item, deemed not medically necessary or considered experimental.   I understand that any cost not covered by my insurance carrier will be solely my financial responsibility.  I have received the Supplier Standards and have reviewed them.    I have received the prescribed item and have been fully instructed on the proper use of the above services/products.    ______ (Patient Initials) I understand that all DME items are non-returnable after being have the right to expect a reasonable response to your requests for treatment or service. You have the right to talk in confidence with health care providers and to have your health care information protected. You have the right to receive an explanation of your bill. You have the right to complain about the service or product you receive. Patient Responsibilities:  Please provide complete and accurate information about your health insurance benefits and make arrangements for the timely payment of your bill. [facility] will, if possible, assume responsibility for billing your insurance (Medicare, Medicaid and commercial) for the prescribed equipment or devices. If your policy does not cover the prescribed product, or only covers a portion of the bill, you are responsible for any remaining balance. Return and Exchange Policy:  [facility] will honor published  Warranties for products. [facility] will accept returns or exchanges within 14 days from the date of receipt, providin) the product must be in new condition; 2) receipt as required; and 3) used disposable and hygiene products may only be returned due to a defective product. Note: Refunds will be issued in a timely manner, please allow 4-6 weeks for processing. Complaint Procedures and DME Consumer Protection Resources:  [facility] values you as a customer, and is committed to resolving patient concerns. This commitment includes understanding and documenting your concerns, conducting a review of internal procedures, and providing you with an explanation and resolution to your concerns. Should you have any questions about our services or billing process, please contact our office at (practice phone number). If we are unable to resolve the concern, you have the right to direct comments to the office of Consumer Protection, in the [address] or the [facility] office, without fear of repercussion.     Aga Greer STANDARDS    A supplier must be in compliance with all applicable Federal and State licensure and regulatory requirements. A supplier must provide complete and accurate information on the DMEPOS supplier application. Any changes to this information must be reported to the [facility] within 30 days. An authorized individual (one whose signature is binding) must sign the application for billing privileges. A supplier must fill orders from its own inventory, or must contract with other companies for the purchase of items necessary to fill the order. A supplier may not contract with any entity that is currently excluded from the Medicare program, any [facility] program, or from any other Federal procurement or Nonprocurement programs. A supplier must advise beneficiaries that they may rent or purchase inexpensive or routinely purchased durable medical equipment, and of the purchase option for capped rental equipment. A supplier must notify beneficiaries of warranty coverage and honor all warranties under applicable State Law, and repair or replace free of charge Medicare covered items that are under warranty. A supplier must maintain a physical facility on an appropriate site. A supplier must permit CMS, or its agents to conduct on-site inspections to ascertain the supplier's compliance with these standards. The supplier location must be accessible to beneficiaries during reasonable business hours, and must maintain a visible sign and posted hours of operation. A supplier must maintain a primary business telephone listed under the name of the business in a [employer] or a toll free number available through directory assistance. The exclusive use of a beeper, answering machine or cell phone is prohibited.   A supplier must have comprehensive liability insurance in the amount of at least $300,000 that covers both the supplier's place of business and all customers and employees of the supplier. If the supplier manufactures its own items, this insurance must also cover product liability and completed operations. A supplier must agree not to initiate telephone contact with beneficiaries, with a few exceptions allowed. This standard prohibits suppliers from calling beneficiaries in order to solicit new business. A supplier is responsible for delivery and must instruct beneficiaries on use of Medicare covered items, and maintain proof of delivery. A supplier must answer questions, and respond to complaints of the beneficiaries, and maintain documentation of such contacts. A supplier must maintain and replace at no charge or repair directly, or through a service contract with another company, Medicare covered items it has rented to beneficiaries. A supplier must accept returns of substandard (less than full quality for the particular item) or unsuitable items (inappropriate for the beneficiary at the time it was fitted and rented or sold) from beneficiaries. A supplier must disclose these supplier standards to each beneficiary to whom it supplies a Medicare-covered item. A supplier must disclose to the government any person having ownership, financial, or control interest in the supplier. A supplier must not convey or reassign a supplier number; i.e., the supplier may not sell or allow another entity to use its Medicare billing number. A supplier must have a complaint resolution protocol established to address beneficiary complaints that relate to these standards. A record of these complaints must be maintained at the physical facility. Complaint records must include: the name, address, telephone number and health insurance claim number of the beneficiary, a summary of the complaint, and any action taken to resolve it. A supplier must agree to furnish CMS any information required by the Medicare statute and implementing regulations.   A supplier of DMEPOS and other items and services must be accredited by a CMS-approved accreditation organization in order to receive and retain a supplier billing number. The accreditation must indicate the specific products and services, for which the supplier is accredited in order for the supplier to receive payment for those specific products and services. A DMEPOS supplier must notify their accreditation organization when a new DMEPOS location is opened. The accreditation organization may accredit the new supplier location for three months after it is operational without requiring a new site visit. All DMEPOS supplier locations, whether owned or subcontracted, must meet the [employer] and be separately accredited in order to bill Medicare. An accredited supplier may be denied enrollment or their enrollment may be revoked, if CMS determines that they are not in compliance with the DMEPOS quality standards. A DMEPOS supplier must disclose upon enrollment all products and services, including the addition of new product lines for which they are seeking accreditation. If a new product line is added after enrollment, the DMEPOS supplier will be responsible for notifying the accrediting body of the new product so that the DMEPOS supplier can be re-surveyed and accredited for these new products. Must meet the surety bond requirements specified in 42 C. F.R. 424.57(c). Implementation date- May 4, 2009. A supplier must obtain oxygen from a state-licensed oxygen supplier. A supplier must maintain ordering and referring documentation consistent with provisions found in 42 C. F.R. 424.516(f). DMEPOS suppliers are prohibited from sharing a practice location with certain other Medicare providers and suppliers. DMEPOS suppliers must remain open to the public for a minimum of 30 hours per week with certain exceptions.

## 2022-12-28 NOTE — PROGRESS NOTES
Patient was fitted and instructed on a Reparel Ankle Sleeve for the left foot. The patient was prescribed a Wraptor brace for the patient's leftfoot. The patient wears a size NA shoe and I fitted the patient with a L brace. I explained how to fit the brace properly by pulling the lace tabs across top of foot first then under arch and lastly pulling the strap up firmly and attaching to the lateral Velcro strip. Thus forming a figure 8 across the ankle joint. Once the figure 8 is completed they are to secure the top (short circumferential) straps to help avoid the straps from loosening with normal wear. The patient was able to demonstrate proper fitting in office to ensure compliance with device and acknowledged satisfaction with current fit. Patient read and signed documenting they understand and agree to [facility]'s current DME return policy.

## 2023-01-30 ENCOUNTER — PATIENT MESSAGE (OUTPATIENT)
Dept: INTERNAL MEDICINE CLINIC | Facility: CLINIC | Age: 58
End: 2023-01-30

## 2023-01-30 DIAGNOSIS — E66.01 CLASS 2 SEVERE OBESITY DUE TO EXCESS CALORIES WITH SERIOUS COMORBIDITY AND BODY MASS INDEX (BMI) OF 38.0 TO 38.9 IN ADULT (HCC): ICD-10-CM

## 2023-01-30 RX ORDER — PHENTERMINE HYDROCHLORIDE 37.5 MG/1
37.5 CAPSULE ORAL EVERY MORNING
Qty: 30 CAPSULE | Refills: 1 | Status: SHIPPED | OUTPATIENT
Start: 2023-01-30 | End: 2023-04-30

## 2023-01-30 RX ORDER — PHENTERMINE HYDROCHLORIDE 37.5 MG/1
37.5 CAPSULE ORAL EVERY MORNING
Qty: 30 CAPSULE | Refills: 0 | Status: SHIPPED | OUTPATIENT
Start: 2023-01-30 | End: 2023-01-30

## 2023-04-21 RX ORDER — TRAZODONE HYDROCHLORIDE 50 MG/1
TABLET ORAL
Qty: 90 TABLET | Refills: 5 | Status: SHIPPED | OUTPATIENT
Start: 2023-04-21

## 2023-05-18 ENCOUNTER — OFFICE VISIT (OUTPATIENT)
Dept: INTERNAL MEDICINE CLINIC | Facility: CLINIC | Age: 58
End: 2023-05-18
Payer: COMMERCIAL

## 2023-05-18 VITALS
SYSTOLIC BLOOD PRESSURE: 122 MMHG | DIASTOLIC BLOOD PRESSURE: 65 MMHG | WEIGHT: 214 LBS | HEART RATE: 74 BPM | BODY MASS INDEX: 33.59 KG/M2 | RESPIRATION RATE: 18 BRPM | HEIGHT: 67 IN

## 2023-05-18 DIAGNOSIS — Z12.31 OTHER SCREENING MAMMOGRAM: Primary | ICD-10-CM

## 2023-05-18 DIAGNOSIS — I34.1 MITRAL VALVE PROLAPSE: ICD-10-CM

## 2023-05-18 DIAGNOSIS — Z00.00 GENERAL MEDICAL EXAM: ICD-10-CM

## 2023-05-18 DIAGNOSIS — E66.01 CLASS 2 SEVERE OBESITY DUE TO EXCESS CALORIES WITH SERIOUS COMORBIDITY AND BODY MASS INDEX (BMI) OF 38.0 TO 38.9 IN ADULT (HCC): ICD-10-CM

## 2023-05-18 DIAGNOSIS — Z79.899 ENCOUNTER FOR LONG-TERM CURRENT USE OF MEDICATION: ICD-10-CM

## 2023-05-18 DIAGNOSIS — E03.9 ACQUIRED HYPOTHYROIDISM: ICD-10-CM

## 2023-05-18 LAB
ALBUMIN SERPL-MCNC: 3.8 G/DL (ref 3.5–5)
ALBUMIN/GLOB SERPL: 1 (ref 0.4–1.6)
ALP SERPL-CCNC: 65 U/L (ref 50–136)
ALT SERPL-CCNC: 23 U/L (ref 12–65)
ANION GAP SERPL CALC-SCNC: 7 MMOL/L (ref 2–11)
APPEARANCE UR: CLEAR
AST SERPL-CCNC: 18 U/L (ref 15–37)
BASOPHILS # BLD: 0.1 K/UL (ref 0–0.2)
BASOPHILS NFR BLD: 1 % (ref 0–2)
BILIRUB SERPL-MCNC: 0.7 MG/DL (ref 0.2–1.1)
BILIRUB UR QL: NEGATIVE
BUN SERPL-MCNC: 10 MG/DL (ref 6–23)
CALCIUM SERPL-MCNC: 9.1 MG/DL (ref 8.3–10.4)
CHLORIDE SERPL-SCNC: 105 MMOL/L (ref 101–110)
CHOLEST SERPL-MCNC: 156 MG/DL
CO2 SERPL-SCNC: 26 MMOL/L (ref 21–32)
COLOR UR: NORMAL
CREAT SERPL-MCNC: 1 MG/DL (ref 0.6–1)
DIFFERENTIAL METHOD BLD: ABNORMAL
EOSINOPHIL # BLD: 0.1 K/UL (ref 0–0.8)
EOSINOPHIL NFR BLD: 1 % (ref 0.5–7.8)
ERYTHROCYTE [DISTWIDTH] IN BLOOD BY AUTOMATED COUNT: 12.7 % (ref 11.9–14.6)
GLOBULIN SER CALC-MCNC: 3.7 G/DL (ref 2.8–4.5)
GLUCOSE SERPL-MCNC: 74 MG/DL (ref 65–100)
GLUCOSE UR STRIP.AUTO-MCNC: NEGATIVE MG/DL
HCT VFR BLD AUTO: 46.6 % (ref 35.8–46.3)
HDLC SERPL-MCNC: 56 MG/DL (ref 40–60)
HDLC SERPL: 2.8
HGB BLD-MCNC: 15.3 G/DL (ref 11.7–15.4)
HGB UR QL STRIP: NEGATIVE
IMM GRANULOCYTES # BLD AUTO: 0 K/UL (ref 0–0.5)
IMM GRANULOCYTES NFR BLD AUTO: 0 % (ref 0–5)
KETONES UR QL STRIP.AUTO: NEGATIVE MG/DL
LDLC SERPL CALC-MCNC: 82.2 MG/DL
LEUKOCYTE ESTERASE UR QL STRIP.AUTO: NEGATIVE
LYMPHOCYTES # BLD: 2.2 K/UL (ref 0.5–4.6)
LYMPHOCYTES NFR BLD: 31 % (ref 13–44)
MCH RBC QN AUTO: 29.3 PG (ref 26.1–32.9)
MCHC RBC AUTO-ENTMCNC: 32.8 G/DL (ref 31.4–35)
MCV RBC AUTO: 89.3 FL (ref 82–102)
MONOCYTES # BLD: 0.6 K/UL (ref 0.1–1.3)
MONOCYTES NFR BLD: 8 % (ref 4–12)
NEUTS SEG # BLD: 4.1 K/UL (ref 1.7–8.2)
NEUTS SEG NFR BLD: 59 % (ref 43–78)
NITRITE UR QL STRIP.AUTO: NEGATIVE
NRBC # BLD: 0 K/UL (ref 0–0.2)
PH UR STRIP: 5.5 (ref 5–9)
PLATELET # BLD AUTO: 325 K/UL (ref 150–450)
PMV BLD AUTO: 9.1 FL (ref 9.4–12.3)
POTASSIUM SERPL-SCNC: 4.2 MMOL/L (ref 3.5–5.1)
PROT SERPL-MCNC: 7.5 G/DL (ref 6.3–8.2)
PROT UR STRIP-MCNC: NEGATIVE MG/DL
RBC # BLD AUTO: 5.22 M/UL (ref 4.05–5.2)
SODIUM SERPL-SCNC: 138 MMOL/L (ref 133–143)
SP GR UR REFRACTOMETRY: 1.01 (ref 1–1.02)
T4 FREE SERPL-MCNC: 1.3 NG/DL (ref 0.78–1.46)
TRIGL SERPL-MCNC: 89 MG/DL (ref 35–150)
TSH W FREE THYROID IF ABNORMAL: 0.34 UIU/ML (ref 0.36–3.74)
UROBILINOGEN UR QL STRIP.AUTO: 0.2 EU/DL (ref 0.2–1)
VLDLC SERPL CALC-MCNC: 17.8 MG/DL (ref 6–23)
WBC # BLD AUTO: 7 K/UL (ref 4.3–11.1)

## 2023-05-18 PROCEDURE — 99214 OFFICE O/P EST MOD 30 MIN: CPT | Performed by: INTERNAL MEDICINE

## 2023-05-18 RX ORDER — PHENTERMINE HYDROCHLORIDE 37.5 MG/1
37.5 CAPSULE ORAL EVERY MORNING
Qty: 30 CAPSULE | Refills: 1 | Status: SHIPPED | OUTPATIENT
Start: 2023-05-18 | End: 2023-08-16

## 2023-05-18 SDOH — ECONOMIC STABILITY: INCOME INSECURITY: HOW HARD IS IT FOR YOU TO PAY FOR THE VERY BASICS LIKE FOOD, HOUSING, MEDICAL CARE, AND HEATING?: NOT HARD AT ALL

## 2023-05-18 SDOH — ECONOMIC STABILITY: FOOD INSECURITY: WITHIN THE PAST 12 MONTHS, YOU WORRIED THAT YOUR FOOD WOULD RUN OUT BEFORE YOU GOT MONEY TO BUY MORE.: NEVER TRUE

## 2023-05-18 SDOH — ECONOMIC STABILITY: HOUSING INSECURITY
IN THE LAST 12 MONTHS, WAS THERE A TIME WHEN YOU DID NOT HAVE A STEADY PLACE TO SLEEP OR SLEPT IN A SHELTER (INCLUDING NOW)?: NO

## 2023-05-18 SDOH — ECONOMIC STABILITY: FOOD INSECURITY: WITHIN THE PAST 12 MONTHS, THE FOOD YOU BOUGHT JUST DIDN'T LAST AND YOU DIDN'T HAVE MONEY TO GET MORE.: NEVER TRUE

## 2023-05-18 ASSESSMENT — ENCOUNTER SYMPTOMS
NAUSEA: 0
DIARRHEA: 0
VOMITING: 0
WHEEZING: 0
CONSTIPATION: 0
BLOOD IN STOOL: 0
COUGH: 0
SHORTNESS OF BREATH: 0

## 2023-05-18 ASSESSMENT — PATIENT HEALTH QUESTIONNAIRE - PHQ9
1. LITTLE INTEREST OR PLEASURE IN DOING THINGS: 0
SUM OF ALL RESPONSES TO PHQ9 QUESTIONS 1 & 2: 0
SUM OF ALL RESPONSES TO PHQ QUESTIONS 1-9: 0
2. FEELING DOWN, DEPRESSED OR HOPELESS: 0

## 2023-05-18 NOTE — PROGRESS NOTES
adenopathy. Neurological:      Mental Status: She is alert and oriented to person, place, and time. Motor: No weakness. Gait: Gait normal.      Deep Tendon Reflexes:      Reflex Scores:       Patellar reflexes are 2+ on the right side and 2+ on the left side. Psychiatric:         Mood and Affect: Mood normal.         Behavior: Behavior normal.         Assessment & Plan    Current Outpatient Medications   Medication Sig Dispense Refill    phentermine 37.5 MG capsule Take 1 capsule by mouth every morning for 90 days. Max Daily Amount: 37.5 mg 30 capsule 1    traZODone (DESYREL) 50 MG tablet TAKE 1-3 TABLETS BY MOUTH NIGHTLY 90 tablet 5    ibuprofen (ADVIL) 200 MG tablet Take 2 tablets by mouth 2 times daily (before meals) 120 tablet 3    traZODone (DESYREL) 100 MG tablet TAKE ONE TABLET BY MOUTH AT NIGHT AS NEEDED FOR SLEEP 90 tablet 3    Apple Cider Vinegar 300 MG TABS Take by mouth      ascorbic acid (VITAMIN C) 250 MG tablet Take by mouth      Biotin 2.5 MG CAPS Take by mouth      Cholecalciferol 50 MCG (2000 UT) TABS Take by mouth daily      cyanocobalamin 1000 MCG tablet Take 1 tablet by mouth daily      levothyroxine (SYNTHROID) 75 MCG tablet Take 1 tablet by mouth every morning 90 tablet 3     No current facility-administered medications for this visit.        Orders Placed This Encounter   Procedures    ALISA KELIN DIGITAL SCREEN BILATERAL     Standing Status:   Future     Standing Expiration Date:   7/18/2024     Scheduling Instructions:      2/3/2021 [place]     Order Specific Question:   Reason for exam:     Answer:   screening    Urinalysis     Standing Status:   Future     Number of Occurrences:   1     Standing Expiration Date:   5/18/2024    Comprehensive Metabolic Panel     Standing Status:   Future     Number of Occurrences:   1     Standing Expiration Date:   5/18/2024    TSH with Reflex     Standing Status:   Future     Number of Occurrences:   1     Standing Expiration Date:

## 2023-05-19 RX ORDER — LEVOTHYROXINE SODIUM 0.07 MG/1
75 TABLET ORAL EVERY MORNING
Qty: 90 TABLET | Refills: 3 | Status: SHIPPED | OUTPATIENT
Start: 2023-05-19

## 2023-05-19 NOTE — RESULT ENCOUNTER NOTE
Labs are stable except you have been getting too much thyroid hormone. Please return to the 75 mcg dosage. Otherwise, continue your current doses of medications. Keep up the good work. Thanks.   Kait Villagomez

## 2023-10-25 RX ORDER — TRAZODONE HYDROCHLORIDE 50 MG/1
TABLET ORAL
Qty: 90 TABLET | Refills: 5 | Status: SHIPPED | OUTPATIENT
Start: 2023-10-25

## 2023-12-03 ASSESSMENT — PATIENT HEALTH QUESTIONNAIRE - PHQ9
1. LITTLE INTEREST OR PLEASURE IN DOING THINGS: 1
SUM OF ALL RESPONSES TO PHQ9 QUESTIONS 1 & 2: 2
2. FEELING DOWN, DEPRESSED OR HOPELESS: 1
SUM OF ALL RESPONSES TO PHQ QUESTIONS 1-9: 2
SUM OF ALL RESPONSES TO PHQ QUESTIONS 1-9: 2
2. FEELING DOWN, DEPRESSED OR HOPELESS: SEVERAL DAYS
SUM OF ALL RESPONSES TO PHQ9 QUESTIONS 1 & 2: 2
1. LITTLE INTEREST OR PLEASURE IN DOING THINGS: SEVERAL DAYS
SUM OF ALL RESPONSES TO PHQ QUESTIONS 1-9: 2
SUM OF ALL RESPONSES TO PHQ QUESTIONS 1-9: 2

## 2023-12-06 ENCOUNTER — OFFICE VISIT (OUTPATIENT)
Dept: INTERNAL MEDICINE CLINIC | Facility: CLINIC | Age: 58
End: 2023-12-06
Payer: COMMERCIAL

## 2023-12-06 VITALS
DIASTOLIC BLOOD PRESSURE: 61 MMHG | SYSTOLIC BLOOD PRESSURE: 129 MMHG | HEART RATE: 83 BPM | RESPIRATION RATE: 18 BRPM | BODY MASS INDEX: 35.16 KG/M2 | HEIGHT: 67 IN | WEIGHT: 224 LBS

## 2023-12-06 DIAGNOSIS — E66.01 CLASS 2 SEVERE OBESITY DUE TO EXCESS CALORIES WITH SERIOUS COMORBIDITY AND BODY MASS INDEX (BMI) OF 38.0 TO 38.9 IN ADULT (HCC): Primary | ICD-10-CM

## 2023-12-06 DIAGNOSIS — B35.3 TINEA PEDIS OF RIGHT FOOT: ICD-10-CM

## 2023-12-06 DIAGNOSIS — E03.9 ACQUIRED HYPOTHYROIDISM: ICD-10-CM

## 2023-12-06 DIAGNOSIS — M54.50 LOW BACK PAIN, UNSPECIFIED BACK PAIN LATERALITY, UNSPECIFIED CHRONICITY, UNSPECIFIED WHETHER SCIATICA PRESENT: ICD-10-CM

## 2023-12-06 DIAGNOSIS — Z79.899 ENCOUNTER FOR LONG-TERM CURRENT USE OF MEDICATION: ICD-10-CM

## 2023-12-06 DIAGNOSIS — F51.01 PRIMARY INSOMNIA: ICD-10-CM

## 2023-12-06 LAB
ALBUMIN SERPL-MCNC: 4 G/DL (ref 3.5–5)
ALBUMIN/GLOB SERPL: 1.1 (ref 0.4–1.6)
ALP SERPL-CCNC: 59 U/L (ref 50–136)
ALT SERPL-CCNC: 22 U/L (ref 12–65)
ANION GAP SERPL CALC-SCNC: 6 MMOL/L (ref 2–11)
AST SERPL-CCNC: 14 U/L (ref 15–37)
BILIRUB SERPL-MCNC: 0.6 MG/DL (ref 0.2–1.1)
BUN SERPL-MCNC: 17 MG/DL (ref 6–23)
CALCIUM SERPL-MCNC: 9.6 MG/DL (ref 8.3–10.4)
CHLORIDE SERPL-SCNC: 105 MMOL/L (ref 103–113)
CO2 SERPL-SCNC: 27 MMOL/L (ref 21–32)
CREAT SERPL-MCNC: 1 MG/DL (ref 0.6–1)
GLOBULIN SER CALC-MCNC: 3.8 G/DL (ref 2.8–4.5)
GLUCOSE SERPL-MCNC: 98 MG/DL (ref 65–100)
POTASSIUM SERPL-SCNC: 4.1 MMOL/L (ref 3.5–5.1)
PROT SERPL-MCNC: 7.8 G/DL (ref 6.3–8.2)
SODIUM SERPL-SCNC: 138 MMOL/L (ref 136–146)
TSH W FREE THYROID IF ABNORMAL: 2.06 UIU/ML (ref 0.36–3.74)

## 2023-12-06 PROCEDURE — 99214 OFFICE O/P EST MOD 30 MIN: CPT | Performed by: INTERNAL MEDICINE

## 2023-12-06 RX ORDER — CYCLOBENZAPRINE HCL 5 MG
5 TABLET ORAL 3 TIMES DAILY PRN
Qty: 30 TABLET | Refills: 0 | Status: SHIPPED | OUTPATIENT
Start: 2023-12-06 | End: 2023-12-16

## 2023-12-06 RX ORDER — TERBINAFINE HYDROCHLORIDE 250 MG/1
250 TABLET ORAL DAILY
Qty: 14 TABLET | Refills: 0 | Status: SHIPPED | OUTPATIENT
Start: 2023-12-06 | End: 2023-12-20

## 2023-12-06 ASSESSMENT — ENCOUNTER SYMPTOMS
NAUSEA: 0
DIARRHEA: 0
SHORTNESS OF BREATH: 0
COUGH: 0
CONSTIPATION: 0
VOMITING: 0
BLOOD IN STOOL: 0
WHEEZING: 0

## 2023-12-06 NOTE — PROGRESS NOTES
12/6/2023 5:19 PM  Location:[address] [facility]  [place]  Patient #:  [id number]  YOB: 1965            History of Present Illness     Chief Complaint   Patient presents with    6 Month Follow-Up     6 month f/u    Weight Management     Wants to discuss going on Phentermine. Other     Has a place on her right foot she would like looked at. Ms. Alban Santana is a 65 y.o. female  who presents for the above.  fractured his hip. Is doing well with his cancer treatments. She is very busy care giving for him and others in her family.            Allergies   Allergen Reactions    Macadamia Nut Oil Other (See Comments)     Walnuts     Penicillins Other (See Comments)     \"been so long, I don't know\"    Avocado Nausea And Vomiting     \"virus-like symptoms\"     Past Medical History:   Diagnosis Date    Arthritis     osteo    [place] viral infection 2006    insomnia and joint pain since    Mitral valve prolapse     hx of- pt reports no problems    Obesity (BMI 30-39.9) 4/4/2018     Social History     Socioeconomic History    Marital status:      Spouse name: None    Number of children: None    Years of education: None    Highest education level: None   Tobacco Use    Smoking status: Former     Packs/day: 0.50     Years: 20.00     Additional pack years: 0.00     Total pack years: 10.00     Types: Cigarettes     Quit date: 1/1/2020     Years since quitting: 3.9     Passive exposure: Never    Smokeless tobacco: Never   Substance and Sexual Activity    Alcohol use: Not Currently    Drug use: No    Sexual activity: Not Currently     Social Determinants of Health     Financial Resource Strain: Low Risk  (5/18/2023)    Overall Financial Resource Strain (CARDIA)     Difficulty of Paying Living Expenses: Not hard at all   Transportation Needs: Unknown (5/18/2023)    PRAPARE - Transportation     Lack of Transportation (Non-Medical): No   Housing Stability:

## 2023-12-07 NOTE — RESULT ENCOUNTER NOTE
Labs are stable. Continue your current doses of medications. Keep up the good work. Thanks.   [address]

## 2024-05-13 RX ORDER — LEVOTHYROXINE SODIUM 0.07 MG/1
75 TABLET ORAL EVERY MORNING
Qty: 90 TABLET | Refills: 3 | Status: SHIPPED | OUTPATIENT
Start: 2024-05-13

## 2024-05-29 ENCOUNTER — PATIENT MESSAGE (OUTPATIENT)
Dept: INTERNAL MEDICINE CLINIC | Facility: CLINIC | Age: 59
End: 2024-05-29

## 2024-05-29 NOTE — TELEPHONE ENCOUNTER
From: Mayra Woods  To: Dr. Sharon Mendoza  Sent: 5/29/2024 3:11 PM EDT  Subject: Update    Hey Dr. Mendoza,  I have cancelled my appointment on June 10th. Redd had his PET scan May 2nd, it showed some questionable places in his lungs. He has a f/u CT on June 10th, then our f/u with the oncologist in the 17th. I didn't want you to think I just cancelled our appointment for no reason. You have been so good to me. Please continue to pray for Redd. The oncologist feels it could possibly be inflammation, so we are hopeful.   Update: the Qsymia wasn't the \"fix\". I tried it for 30 days, but since it didn't work, I didn't refill it, as it was $145. If you have any other ideas, I am 100% willing to try whatever you think is best. Thank you so much for all you do!  [place]

## 2024-06-10 RX ORDER — TRAZODONE HYDROCHLORIDE 100 MG/1
TABLET ORAL
Qty: 90 TABLET | Refills: 3 | Status: SHIPPED | OUTPATIENT
Start: 2024-06-10

## 2024-11-10 RX ORDER — NALTREXONE HYDROCHLORIDE AND BUPROPION HYDROCHLORIDE 8; 90 MG/1; MG/1
TABLET, EXTENDED RELEASE ORAL
Qty: 360 TABLET | Refills: 0 | Status: SHIPPED | OUTPATIENT
Start: 2024-11-10

## 2024-11-11 NOTE — TELEPHONE ENCOUNTER
I have talked with MsHoa Chuck. Her  has cancer that has spread to his lungs. They were in the drs office today and they are having to set up treatments for him. She doesn't know when she will be able to make an appointment with us. She can't function right now, worrying about him.

## 2025-01-31 RX ORDER — NALTREXONE HYDROCHLORIDE AND BUPROPION HYDROCHLORIDE 8; 90 MG/1; MG/1
TABLET, EXTENDED RELEASE ORAL
Qty: 360 TABLET | Refills: 0 | OUTPATIENT
Start: 2025-01-31

## 2025-05-08 RX ORDER — LEVOTHYROXINE SODIUM 75 UG/1
75 TABLET ORAL EVERY MORNING
Qty: 90 TABLET | Refills: 3 | Status: SHIPPED | OUTPATIENT
Start: 2025-05-08

## 2025-06-05 RX ORDER — TRAZODONE HYDROCHLORIDE 100 MG/1
TABLET ORAL
Qty: 90 TABLET | Refills: 3 | OUTPATIENT
Start: 2025-06-05